# Patient Record
Sex: MALE | Race: WHITE | URBAN - METROPOLITAN AREA
[De-identification: names, ages, dates, MRNs, and addresses within clinical notes are randomized per-mention and may not be internally consistent; named-entity substitution may affect disease eponyms.]

---

## 2017-04-06 ENCOUNTER — HISTORICAL (OUTPATIENT)
Dept: LAB | Facility: HOSPITAL | Age: 57
End: 2017-04-06

## 2017-04-13 ENCOUNTER — HISTORICAL (OUTPATIENT)
Dept: RADIOLOGY | Facility: HOSPITAL | Age: 57
End: 2017-04-13

## 2017-05-24 ENCOUNTER — HISTORICAL (OUTPATIENT)
Dept: ADMINISTRATIVE | Facility: HOSPITAL | Age: 57
End: 2017-05-24

## 2017-05-24 LAB
ABS NEUT (OLG): 6.2 X10(3)/MCL (ref 2.1–9.2)
ALBUMIN SERPL-MCNC: 4.3 GM/DL (ref 3.4–5)
ALBUMIN/GLOB SERPL: 1 RATIO (ref 1–2)
ALP SERPL-CCNC: 95 UNIT/L (ref 20–120)
ALT SERPL-CCNC: 15 UNIT/L
APPEARANCE, UA: CLEAR
AST SERPL-CCNC: 15 UNIT/L
BACTERIA #/AREA URNS AUTO: ABNORMAL /[HPF]
BASOPHILS # BLD AUTO: 0.05 X10(3)/MCL
BASOPHILS NFR BLD AUTO: 0 % (ref 0–1)
BILIRUB SERPL-MCNC: 0.8 MG/DL
BILIRUB UR QL STRIP: NEGATIVE
BILIRUBIN DIRECT+TOT PNL SERPL-MCNC: 0.2 MG/DL
BILIRUBIN DIRECT+TOT PNL SERPL-MCNC: 0.6 MG/DL
BUN SERPL-MCNC: 12 MG/DL (ref 7–25)
CALCIUM SERPL-MCNC: 9.5 MG/DL (ref 8.4–10.3)
CHLORIDE SERPL-SCNC: 99 MMOL/L (ref 96–110)
CHOLEST SERPL-MCNC: 263 MG/DL
CHOLEST/HDLC SERPL: 6.3 {RATIO} (ref 0–5)
CO2 SERPL-SCNC: 29 MMOL/L (ref 24–32)
COLOR UR: ABNORMAL
CREAT SERPL-MCNC: 0.85 MG/DL (ref 0.7–1.4)
EOSINOPHIL # BLD AUTO: 0.23 X10(3)/MCL
EOSINOPHIL NFR BLD AUTO: 2 % (ref 0–5)
ERYTHROCYTE [DISTWIDTH] IN BLOOD BY AUTOMATED COUNT: 13.9 % (ref 11.5–14.5)
EST. AVERAGE GLUCOSE BLD GHB EST-MCNC: 108 MG/DL
GLOBULIN SER-MCNC: 3.2 GM/ML (ref 2.3–3.5)
GLUCOSE (UA): NORMAL
GLUCOSE SERPL-MCNC: 96 MG/DL (ref 65–99)
HBA1C MFR BLD: 5.4 % (ref 4.7–5.6)
HCT VFR BLD AUTO: 47.1 % (ref 40–51)
HDLC SERPL-MCNC: 42 MG/DL
HGB BLD-MCNC: 16 GM/DL (ref 13.5–17.5)
HGB UR QL STRIP: NEGATIVE
HIV 1+2 AB+HIV1 P24 AG SERPL QL IA: NONREACTIVE
HYALINE CASTS #/AREA URNS LPF: ABNORMAL /[LPF]
IMM GRANULOCYTES # BLD AUTO: 0.03 10*3/UL
IMM GRANULOCYTES NFR BLD AUTO: 0 %
KETONES UR QL STRIP: NEGATIVE
LDLC SERPL CALC-MCNC: 200 MG/DL (ref 0–130)
LEUKOCYTE ESTERASE UR QL STRIP: NEGATIVE
LYMPHOCYTES # BLD AUTO: 2.74 X10(3)/MCL
LYMPHOCYTES NFR BLD AUTO: 28 % (ref 15–40)
MCH RBC QN AUTO: 27.5 PG (ref 26–34)
MCHC RBC AUTO-ENTMCNC: 34 GM/DL (ref 31–37)
MCV RBC AUTO: 80.9 FL (ref 80–100)
MONOCYTES # BLD AUTO: 0.67 X10(3)/MCL
MONOCYTES NFR BLD AUTO: 7 % (ref 4–12)
NEUTROPHILS # BLD AUTO: 6.2 X10(3)/MCL
NEUTROPHILS NFR BLD AUTO: 62 X10(3)/MCL
NITRITE UR QL STRIP: NEGATIVE
PH UR STRIP: 7 [PH] (ref 4.5–8)
PLATELET # BLD AUTO: 242 X10(3)/MCL (ref 130–400)
PMV BLD AUTO: 10.2 FL (ref 7.4–10.4)
POTASSIUM SERPL-SCNC: 4.7 MMOL/L (ref 3.6–5.2)
PROT SERPL-MCNC: 7.5 GM/DL (ref 6–8)
PROT UR QL STRIP: NEGATIVE
RBC # BLD AUTO: 5.82 X10(6)/MCL (ref 4.5–5.9)
RBC #/AREA URNS AUTO: ABNORMAL /[HPF]
SODIUM SERPL-SCNC: 135 MMOL/L (ref 135–146)
SP GR UR STRIP: 1.01 (ref 1–1.03)
SQUAMOUS #/AREA URNS LPF: ABNORMAL /[LPF]
T4 SERPL-MCNC: 8.19 MCG/DL (ref 6.09–12.23)
TRIGL SERPL-MCNC: 103 MG/DL
TSH SERPL-ACNC: 0.79 MIU/L (ref 0.5–5)
UROBILINOGEN UR STRIP-ACNC: 2 MG/DL
VLDLC SERPL CALC-MCNC: 21 MG/DL
WBC # SPEC AUTO: 9.9 X10(3)/MCL (ref 4.5–11)
WBC #/AREA URNS AUTO: ABNORMAL /HPF

## 2017-05-30 ENCOUNTER — HISTORICAL (OUTPATIENT)
Dept: INTERNAL MEDICINE | Facility: CLINIC | Age: 57
End: 2017-05-30

## 2017-08-28 ENCOUNTER — HISTORICAL (OUTPATIENT)
Dept: RADIOLOGY | Facility: HOSPITAL | Age: 57
End: 2017-08-28

## 2017-10-26 ENCOUNTER — HISTORICAL (OUTPATIENT)
Dept: INTERNAL MEDICINE | Facility: CLINIC | Age: 57
End: 2017-10-26

## 2018-03-21 ENCOUNTER — HISTORICAL (OUTPATIENT)
Dept: ADMINISTRATIVE | Facility: HOSPITAL | Age: 58
End: 2018-03-21

## 2018-03-21 LAB
ABS NEUT (OLG): 6.13 X10(3)/MCL (ref 2.1–9.2)
ALBUMIN SERPL-MCNC: 3.8 GM/DL (ref 3.4–5)
ALBUMIN/GLOB SERPL: 1 RATIO (ref 1–2)
ALP SERPL-CCNC: 98 UNIT/L (ref 45–117)
ALT SERPL-CCNC: 20 UNIT/L (ref 12–78)
AST SERPL-CCNC: 14 UNIT/L (ref 15–37)
BASOPHILS # BLD AUTO: 0.05 X10(3)/MCL
BASOPHILS NFR BLD AUTO: 0 %
BILIRUB SERPL-MCNC: 0.4 MG/DL (ref 0.2–1)
BILIRUBIN DIRECT+TOT PNL SERPL-MCNC: 0.1 MG/DL
BILIRUBIN DIRECT+TOT PNL SERPL-MCNC: 0.3 MG/DL
BUN SERPL-MCNC: 11 MG/DL (ref 7–18)
CALCIUM SERPL-MCNC: 8.8 MG/DL (ref 8.5–10.1)
CHLORIDE SERPL-SCNC: 103 MMOL/L (ref 98–107)
CHOLEST SERPL-MCNC: 259 MG/DL
CHOLEST/HDLC SERPL: 6.5 {RATIO} (ref 0–5)
CO2 SERPL-SCNC: 30 MMOL/L (ref 21–32)
CREAT SERPL-MCNC: 1 MG/DL (ref 0.6–1.3)
EOSINOPHIL # BLD AUTO: 0.32 X10(3)/MCL
EOSINOPHIL NFR BLD AUTO: 3 %
ERYTHROCYTE [DISTWIDTH] IN BLOOD BY AUTOMATED COUNT: 14.1 % (ref 11.5–14.5)
EST. AVERAGE GLUCOSE BLD GHB EST-MCNC: 111 MG/DL
GLOBULIN SER-MCNC: 3.7 GM/ML (ref 2.3–3.5)
GLUCOSE SERPL-MCNC: 78 MG/DL (ref 74–106)
HBA1C MFR BLD: 5.5 % (ref 4.2–6.3)
HCT VFR BLD AUTO: 51 % (ref 40–51)
HDLC SERPL-MCNC: 40 MG/DL
HGB BLD-MCNC: 16.9 GM/DL (ref 13.5–17.5)
IMM GRANULOCYTES # BLD AUTO: 0.04 10*3/UL
IMM GRANULOCYTES NFR BLD AUTO: 0 %
LDLC SERPL CALC-MCNC: 190 MG/DL (ref 0–130)
LYMPHOCYTES # BLD AUTO: 3 X10(3)/MCL
LYMPHOCYTES NFR BLD AUTO: 29 % (ref 13–40)
MCH RBC QN AUTO: 28.1 PG (ref 26–34)
MCHC RBC AUTO-ENTMCNC: 33.1 GM/DL (ref 31–37)
MCV RBC AUTO: 84.9 FL (ref 80–100)
MONOCYTES # BLD AUTO: 0.85 X10(3)/MCL
MONOCYTES NFR BLD AUTO: 8 % (ref 4–12)
NEUTROPHILS # BLD AUTO: 6.13 X10(3)/MCL
NEUTROPHILS NFR BLD AUTO: 59 X10(3)/MCL
PLATELET # BLD AUTO: 237 X10(3)/MCL (ref 130–400)
PMV BLD AUTO: 11.3 FL (ref 7.4–10.4)
POTASSIUM SERPL-SCNC: 4.3 MMOL/L (ref 3.5–5.1)
PROT SERPL-MCNC: 7.5 GM/DL (ref 6.4–8.2)
PSA SERPL-MCNC: 1.3 NG/ML
RBC # BLD AUTO: 6.01 X10(6)/MCL (ref 4.5–5.9)
SODIUM SERPL-SCNC: 139 MMOL/L (ref 136–145)
TRIGL SERPL-MCNC: 146 MG/DL
TSH SERPL-ACNC: 1.24 MIU/L (ref 0.36–3.74)
VLDLC SERPL CALC-MCNC: 29 MG/DL
WBC # SPEC AUTO: 10.4 X10(3)/MCL (ref 4.5–11)

## 2018-05-02 ENCOUNTER — HISTORICAL (OUTPATIENT)
Dept: INTERNAL MEDICINE | Facility: CLINIC | Age: 58
End: 2018-05-02

## 2018-08-14 ENCOUNTER — HISTORICAL (OUTPATIENT)
Dept: ADMINISTRATIVE | Facility: HOSPITAL | Age: 58
End: 2018-08-14

## 2018-08-14 LAB
APTT PPP: 38.3 SECOND(S) (ref 23.3–37)
BUN SERPL-MCNC: 11 MG/DL (ref 7–18)
CALCIUM SERPL-MCNC: 9 MG/DL (ref 8.5–10.1)
CHLORIDE SERPL-SCNC: 102 MMOL/L (ref 98–107)
CO2 SERPL-SCNC: 30 MMOL/L (ref 21–32)
CREAT SERPL-MCNC: 1.1 MG/DL (ref 0.6–1.3)
CREAT/UREA NIT SERPL: 10
ERYTHROCYTE [DISTWIDTH] IN BLOOD BY AUTOMATED COUNT: 13.8 % (ref 11.5–14.5)
GLUCOSE SERPL-MCNC: 101 MG/DL (ref 74–106)
HCT VFR BLD AUTO: 47.2 % (ref 40–51)
HGB BLD-MCNC: 16 GM/DL (ref 13.5–17.5)
INR PPP: 0.98 (ref 0.9–1.2)
MCH RBC QN AUTO: 27.8 PG (ref 26–34)
MCHC RBC AUTO-ENTMCNC: 33.9 GM/DL (ref 31–37)
MCV RBC AUTO: 82.1 FL (ref 80–100)
PLATELET # BLD AUTO: 217 X10(3)/MCL (ref 130–400)
PMV BLD AUTO: 11.4 FL (ref 7.4–10.4)
POTASSIUM SERPL-SCNC: 4.2 MMOL/L (ref 3.5–5.1)
PROTHROMBIN TIME: 12.3 SECOND(S) (ref 11.9–14.4)
RBC # BLD AUTO: 5.75 X10(6)/MCL (ref 4.5–5.9)
SODIUM SERPL-SCNC: 138 MMOL/L (ref 136–145)
WBC # SPEC AUTO: 7.4 X10(3)/MCL (ref 4.5–11)

## 2018-08-22 ENCOUNTER — HISTORICAL (OUTPATIENT)
Dept: SURGERY | Facility: HOSPITAL | Age: 58
End: 2018-08-22

## 2021-04-08 ENCOUNTER — HISTORICAL (OUTPATIENT)
Dept: RADIOLOGY | Facility: HOSPITAL | Age: 61
End: 2021-04-08

## 2021-04-08 LAB — POC CREATININE: 1.3 MG/DL (ref 0.6–1.3)

## 2022-04-11 ENCOUNTER — HISTORICAL (OUTPATIENT)
Dept: ADMINISTRATIVE | Facility: HOSPITAL | Age: 62
End: 2022-04-11

## 2022-04-25 VITALS
SYSTOLIC BLOOD PRESSURE: 156 MMHG | WEIGHT: 187.19 LBS | BODY MASS INDEX: 29.38 KG/M2 | DIASTOLIC BLOOD PRESSURE: 80 MMHG | HEIGHT: 67 IN | OXYGEN SATURATION: 98 %

## 2022-04-30 NOTE — OP NOTE
Patient:   Torito Boyle             MRN: [id number]            FIN: [id number]               Age:   57 years     Sex:  Male     :  1960   Associated Diagnoses:   None   Author:   Iron Maddox MD      [facility] Surgery Note    Planned RIHR aborted due to R inguinal rash at site of anticipated surgical incision.  Pt discharged home w/ f/u in clinic for re-evaluation of rash and re-scheduling of surgery.

## 2022-05-05 NOTE — HISTORICAL [facility] CERNER
This is a historical note converted from Rosalio. Formatting and pictures may have been removed.  Please reference Rosalio for original formatting and attached multimedia. The chart was reviewed and the patient was seen. Agree with above assessment. To OR today for Right Inguinal Hernia Repair.  ?   Nir Mendez MD  Family Medicine, PGY1  ?  ?   Chief Complaint  Referral for hernia.  History of Present Illness  Pt is a 58 y/o m w/ hx of AAA repair presents to clinic for f/u for evaluation of RIH.? Pt reports hernia has progressively gotten worse and has been causing more pain.? Voiding w/ no issues.? Reports having to apply pressure to hernia during BMs on occasion.? Reports no N/V.? Had colonoscopy 1 year ago which he reports was normal.? Reports AAA repair in 2015 where he coded after requiring CPR.? Being followed by cardiology w/ last echo showing EF 59%.? Reports no fevers, chills, N/V, CP, or SOB.? Reports coughing at night due to smoking.? Quit tobacco use since last visit.  Review of Systems  see HPI; 14 point ROS otherwise negative  Physical Exam  ???Vitals & Measurements  ??T:?36.4? ?C (Oral)? HR:?78(Peripheral)? RR:?20? BP:?164/88? WT:?93.5?kg? WT:?93.5?kg?  General: AAO X 3; NAD  CV: RRR; no murmurs, rubs, or gallops  Resp: CTA b/l; no wheezes, crackles, or rhonchi  Abdomen: soft; NT/ND; large RIH w/ scrotal component  Rectal: normal prostate; no masses or abnormalities; good tone w/ stool in vault  Assessment/Plan  ?   - will plan for RIHR on 8/22; will hold off on ventral hernia for now  - risks, benefits, indications, and alternatives discussed and patient agrees to procedure  - Pt seen by cardiology and deemed low to moderate risk for surgery  - continue tobacco cessation ?  ?  ?  ?  ?  ?  ?  Problem List/Past Medical History  ??Ongoing  ??Bipolar illness  ??HLD (hyperlipidemia)  ??HTN (hypertension)  ??Obesity  ??Tobacco user  ?Historical  ??No qualifying data  Procedure/Surgical Historyaorta repair  215  disc repair 2005  hernia repair 1973   ?  Medications  ?Inpatient  ??No active inpatient medications  ?Home  ??Aspir 81 oral delayed release tablet, 81 mg= 1 tab(s), Oral, Daily, 3 refills  ??cyclobenzaprine 5 mg oral tablet, 5 mg= 1 tab(s), Oral, TID, 1 refills  ??lisinopril 20 mg oral tablet, 20 mg= 1 tab(s), Oral, Daily, 11 refills,? ?Still taking, not as prescribed  ??Trileptal 600 mg oral tablet, 600 mg= 1 tab(s), Oral, BID, 11 refills  ??verapamil 120 mg oral tablet, 120 mg= 1 tab(s), Oral, TID, 11 refills,? ?Still taking, not as prescribed: Taking 80mg, not 120.  Allergies  penicillins?(CAUSE THROAT TO SWELL)  Social History  ??Alcohol  ???Past, Liquor, 05/17/2017  ??Employment/School  ???disabled , Work/School description: applying for disability. Highest education level: High school. Operates hazardous equipment: No., 09/27/2017  ??Exercise  ???Self assessment: Good condition., 09/27/2017  ??Home/Environment  ???Lives with Spouse. Living situation: Home/Independent. Alcohol abuse in household: No. Substance abuse in household: No. Smoker in household: Yes. Injuries/Abuse/Neglect in household: No. Feels unsafe at home: No. Safe place to go: Yes. Family/Friends available for support: Yes. Concern for family members at home: No. Major illness in household: No. Financial concerns: No. TV/Computer concerns: No., 09/27/2017  ??Nutrition/Health  ???Regular, 05/17/2017  ??Substance Abuse  ???Past, 05/17/2017  ??Tobacco  ???Current every day smoker Use:. Cigarettes Type:. 40 per day., 08/07/2018  Family History  ??Congestive heart disease.: Mother.  ??Hypertension.: Mother.  ??Lung cancer: Father (Dx at 65).

## 2024-09-30 DIAGNOSIS — S62.306A CLOSED FRACTURE OF FIFTH METACARPAL BONE OF RIGHT HAND: Primary | ICD-10-CM
